# Patient Record
Sex: FEMALE | Race: WHITE | NOT HISPANIC OR LATINO | Employment: FULL TIME | URBAN - METROPOLITAN AREA
[De-identification: names, ages, dates, MRNs, and addresses within clinical notes are randomized per-mention and may not be internally consistent; named-entity substitution may affect disease eponyms.]

---

## 2022-03-23 ENCOUNTER — MEDICAL CORRESPONDENCE (OUTPATIENT)
Dept: HEALTH INFORMATION MANAGEMENT | Facility: CLINIC | Age: 62
End: 2022-03-23
Payer: COMMERCIAL

## 2022-04-26 NOTE — PROGRESS NOTES
Chief Complaint   Patient presents with     Sinus Problem     Home sleep study done in [facility]- told she has sleep apnea and snoring- patient states she has sinus issues - frequent sinus infections with no antibiotic treatment- c/o symptoms fouls smelling discharge from her nose for all of her life - has seen other ENT doctors with no treatment- has left hearing aid and c/o ear pain in that ear     History of Present Illness   Janell Lyle is a 61 year old female who presents for nose and sinus evaluation. I am seeing this patient in consultation for obstructive sleep apnea at the request of the provider Dr. Calvert. The patient has a history of Crouzon syndrome.  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  She essentially has no serviceable hearing on the right-hand side and hears only in her left ear.  She does wear hearing aid in the left ear.  She is previously undergone a mandibular reduction surgery to try to bring her maxillomandibular arches into alignment.  She was to undergo a second stage of the surgery, LeFort III osteotomies to bring the maxilla to the mandible.  The second stage of the surgery was not performed.    The patient was evaluated for significant snoring, noisy breathing, daytime somnolence, witnessed active pauses.  She underwent a home sleep study on 1/18/2022.  My review of the home sleep study demonstrates severe sleep apnea.  The respiratory event index was 59 events per hour.  The events were primarily obstructive.  Her average oxygen saturation during the study was 90.7%.  The recommendation was for in lab study with consideration of PAP therapy titration.  The patient was also referred to ENT for evaluation of her craniofacial status and her airway.    The  "patient also reports having a long history of chronic postnasal drainage and drainage from the right side of her nose for as long as she can remember.  She also intermittently have pain in her left ear.  She does wear hearing aid in her left ear.  Currently denies any otorrhea or bloody otorrhea.  She has had her ears cleaned out in the past.  She has had the middle ear and mastoid surgery on the right previously.  Again, she is had multiple craniofacial surgeries, its uncertain to me if she is had any previous nose or sinus surgery.  She does not use any medications in her nose routinely.    Past Medical History  There is no problem list on file for this patient.    Current Medications   No current outpatient medications on file.    Allergies  Allergies   Allergen Reactions     Losartan Other (See Comments)     Rapid heartbeat       Social History   Social History     Socioeconomic History     Marital status:        Family History  Family History   Problem Relation Age of Onset     Other - See Comments Sister         covid     Cancer Daughter         thyroid       Review of Systems  As per HPI and PMHx, otherwise 10+ comprehensive system review is negative.    Physical Exam  BP (!) 158/84 (BP Location: Right arm, Patient Position: Chair, Cuff Size: Adult Regular)   Pulse 74   Temp 98.2  F (36.8  C) (Tympanic)   Ht 1.473 m (4' 10\")   Wt 90.7 kg (200 lb)   SpO2 96%   BMI 41.80 kg/m    GENERAL: Patient is a pleasant, cooperative 61 year old female in no acute distress.  HEAD: Normocephalic, atraumatic.  Hair and scalp are normal.  EYES: Pupils are equal, round, reactive to light and accommodation.  Extraocular movements are intact.  The sclera nonicteric without injection.  The extraocular structures are normal.  EARS: See below.  NOSE: Nares are patent.  Nasal mucosa is pink and moist.  Negative anterior rhinoscopy.  ORAL CAVITY: Dentition is in reasonably good repair.  The patient does have some slight " micrognathia and a very flat maxilla.  Mucous membranes are moist.  Tongue is mobile, protrudes to the midline.  Palate elevates symmetrically.  Tonsils are 2+, symmetric.  No erythema or exudate.  No oral cavity or oropharyngeal masses, lesions, ulcerations, leukoplakia.  Patient has a Devine tongue/palate position grade 4.  NECK: Supple, trachea is midline.  The patient has 2 finger breaths of mental distance. There no palpable cervical lymphadenopathy or masses bilaterally.  Palpation of the bilateral parotid and submandibular areas reveal no masses.  No thyromegaly.    NEUROLOGIC: Cranial nerves II through XII are grossly intact.  Voice is strong.  Patient is House-Brackmann I/VI bilaterally.  CARDIOVASCULAR: Extremities are warm and well-perfused.  No significant peripheral edema.  RESPIRATORY: Patient has nonlabored breathing without cough, wheeze, stridor.  PSYCHIATRIC: Patient is alert and oriented.  Mood and affect appear normal.  SKIN: Warm and dry.  No scalp, face, or neck lesions noted.    Procedure: Flexible Laryngoscopy  Indication: Severe obstructive sleep breathing, Crouzon syndrome, history of lower jaw surgery    To best visualize the upper airway anatomy and due to the chief complaint and HPI, I proceeded with flexible fiberoptic laryngoscopy examination.  The bilateral nasal cavities were anesthetized and decongested with a mixture of lidocaine and neosynephrine.  The bilateral nasal cavities were examined using a flexible fiberoptic laryngoscope.  There were no nasal cavity masses, polyps, or mucopurulence bilaterally.  The nasal septum is essentially midline.  Of the posterior nasal septum, there are 2 perforations  by a scar band near the area of the Vincent extending towards the nasopharynx..  The nasopharynx had a normal appearance with normal Eustachian tube openings and fossa of Rosenmuller bilaterally.  Minimal adenoid tissue.  In the oropharynx, the patient has significant  lateral collapse in addition to AP collapse from a very large tongue base.  This is evident even with the patient sitting up in the exam chair.  The base of tongue does reveal quite significant lingual tonsil hypertrophy.  The vallecula, epiglottis, aryepiglottic folds, arytenoids, and piriform sinuses were without mass or lesion.  The bilateral true vocal folds were symmetrically mobile without nodules or masses.  The visualized portions of the infraglottic and subglottic airway are unremarkable.  The scope was removed.  The patient tolerated the procedure well.                        Assessment and Plan     ICD-10-CM    1. Severe obstructive sleep apnea  G47.33    2. Crouzon syndrome  Q75.1    3. BMI 40.0-44.9, adult (H)  Z68.41    4. Post-nasal drainage  R09.82    5. Nasal septal perforation  J34.89    6. History of ear surgery  Z98.890      It was my pleasure seeing Janell Lyle today in clinic.  The patient presents with severe sleep apnea that is likely obstructive.  I think the patient's first objective would be to have an in lab study with PAP titration to help start treating her apnea.  At this stage, I do not think there will be any good surgical interventions for her sleep apnea given her apnea severity.  Family was told that she could not have her PAP titration until sometime in August 2022.  I can reach out to colleagues of the [facility] and or [facility] to see if we can get the patient in sooner especially to be seen with someone that has expertise with craniofacial syndrome such as Crouzon.    The patient has severe glossoptosis and very large tongue base that is likely contributing to her breathing troubles in general.  Her previous mandibular surgery to align her jaw to her maxilla likely did not significantly help this condition.  Based on her airway anatomy, maxillofacial surgery could be considered along with tongue base reduction or lingual tonsillectomy, however, given  the severity of her apnea, I think this would likely improve her apnea but not eliminate her apnea.  I think aggressive trial of CPAP or BiPAP prior to any surgical considerations would be first-line management.    With regards the patient's postnasal drainage symptoms, I think her primary focus should be managing her sleep apnea.  We could consider sinus CT/maxillofacial CT.  I want to make sure that I would not be duplicating or repeating imaging, especially if she will end up going to a center of excellence for craniofacial problems.      We could also discuss medical management of her nose including nasal saline irrigation and topical nasal steroids either in her rinse or as a nasal spray.  If symptoms are persistent or she is not improving, I think that we could obtain a sinus CT/maxillofacial CT to better evaluate her paranasal sinuses.    Melina/Janell to follow up with Primary Care provider regarding elevated blood pressure.    Giorgio Gaona MD  Department of Otolaryngology-Head and Neck Surgery  [facility]

## 2022-04-29 ENCOUNTER — OFFICE VISIT (OUTPATIENT)
Dept: OTOLARYNGOLOGY | Facility: CLINIC | Age: 62
End: 2022-04-29
Payer: COMMERCIAL

## 2022-04-29 VITALS
TEMPERATURE: 98.2 F | DIASTOLIC BLOOD PRESSURE: 84 MMHG | WEIGHT: 200 LBS | HEIGHT: 58 IN | HEART RATE: 74 BPM | BODY MASS INDEX: 41.98 KG/M2 | SYSTOLIC BLOOD PRESSURE: 158 MMHG | OXYGEN SATURATION: 96 %

## 2022-04-29 DIAGNOSIS — Q75.1 CROUZON SYNDROME: ICD-10-CM

## 2022-04-29 DIAGNOSIS — G47.33 SEVERE OBSTRUCTIVE SLEEP APNEA: Primary | ICD-10-CM

## 2022-04-29 DIAGNOSIS — R09.82 POST-NASAL DRAINAGE: ICD-10-CM

## 2022-04-29 DIAGNOSIS — J34.89 NASAL SEPTAL PERFORATION: ICD-10-CM

## 2022-04-29 DIAGNOSIS — Z98.890 HISTORY OF EAR SURGERY: ICD-10-CM

## 2022-04-29 PROCEDURE — 31575 DIAGNOSTIC LARYNGOSCOPY: CPT | Performed by: OTOLARYNGOLOGY

## 2022-04-29 PROCEDURE — 99244 OFF/OP CNSLTJ NEW/EST MOD 40: CPT | Mod: 25 | Performed by: OTOLARYNGOLOGY

## 2022-04-29 ASSESSMENT — PAIN SCALES - GENERAL: PAINLEVEL: MILD PAIN (2)

## 2022-04-29 NOTE — NURSING NOTE
Initial BP (!) 158/84 (BP Location: Right arm, Patient Position: Chair, Cuff Size: Adult Regular)   Pulse 74   Temp 98.2  F (36.8  C) (Tympanic)   Wt 90.7 kg (200 lb)   SpO2 96%  There is no height or weight on file to calculate BMI. .    Anni Fong LPN

## 2022-04-29 NOTE — LETTER
4/29/2022         RE: Janell Lyle  [address] [address]        Dear Colleague,    Thank you for referring your patient, Janell Lyle, to the [facility]. Please see a copy of my visit note below.    Chief Complaint   Patient presents with     Sinus Problem     Home sleep study done in [facility]- told she has sleep apnea and snoring- patient states she has sinus issues - frequent sinus infections with no antibiotic treatment- c/o symptoms fouls smelling discharge from her nose for all of her life - has seen other ENT doctors with no treatment- has left hearing aid and c/o ear pain in that ear     History of Present Illness   Janell Lyle is a 61 year old female who presents for nose and sinus evaluation. I am seeing this patient in consultation for obstructive sleep apnea at the request of the provider Dr. Calvert. The patient has a history of Crouzon syndrome.  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  She essentially has no serviceable hearing on the right-hand side and hears only in her left ear.  She does wear hearing aid in the left ear.  She is previously undergone a mandibular reduction surgery to try to bring her maxillomandibular arches into alignment.  She was to undergo a second stage of the surgery, LeFort III osteotomies to bring the maxilla to the mandible.  The second stage of the surgery was not performed.    The patient was evaluated for significant snoring, noisy breathing, daytime somnolence, witnessed active pauses.  She underwent a home sleep study on 1/18/2022.  My review of the home sleep study demonstrates severe sleep apnea.  The respiratory event index was 59 events per hour.  The events were primarily  "obstructive.  Her average oxygen saturation during the study was 90.7%.  The recommendation was for in lab study with consideration of PAP therapy titration.  The patient was also referred to ENT for evaluation of her craniofacial status and her airway.    The patient also reports having a long history of chronic postnasal drainage and drainage from the right side of her nose for as long as she can remember.  She also intermittently have pain in her left ear.  She does wear hearing aid in her left ear.  Currently denies any otorrhea or bloody otorrhea.  She has had her ears cleaned out in the past.  She has had the middle ear and mastoid surgery on the right previously.  Again, she is had multiple craniofacial surgeries, its uncertain to me if she is had any previous nose or sinus surgery.  She does not use any medications in her nose routinely.    Past Medical History  There is no problem list on file for this patient.    Current Medications   No current outpatient medications on file.    Allergies  Allergies   Allergen Reactions     Losartan Other (See Comments)     Rapid heartbeat       Social History   Social History     Socioeconomic History     Marital status:        Family History  Family History   Problem Relation Age of Onset     Other - See Comments Sister         covid     Cancer Daughter         thyroid       Review of Systems  As per HPI and PMHx, otherwise 10+ comprehensive system review is negative.    Physical Exam  BP (!) 158/84 (BP Location: Right arm, Patient Position: Chair, Cuff Size: Adult Regular)   Pulse 74   Temp 98.2  F (36.8  C) (Tympanic)   Ht 1.473 m (4' 10\")   Wt 90.7 kg (200 lb)   SpO2 96%   BMI 41.80 kg/m    GENERAL: Patient is a pleasant, cooperative 61 year old female in no acute distress.  HEAD: Normocephalic, atraumatic.  Hair and scalp are normal.  EYES: Pupils are equal, round, reactive to light and accommodation.  Extraocular movements are intact.  The sclera " nonicteric without injection.  The extraocular structures are normal.  EARS: See below.  NOSE: Nares are patent.  Nasal mucosa is pink and moist.  Negative anterior rhinoscopy.  ORAL CAVITY: Dentition is in reasonably good repair.  The patient does have some slight micrognathia and a very flat maxilla.  Mucous membranes are moist.  Tongue is mobile, protrudes to the midline.  Palate elevates symmetrically.  Tonsils are 2+, symmetric.  No erythema or exudate.  No oral cavity or oropharyngeal masses, lesions, ulcerations, leukoplakia.  Patient has a Devine tongue/palate position grade 4.  NECK: Supple, trachea is midline.  The patient has 2 finger breaths of mental distance. There no palpable cervical lymphadenopathy or masses bilaterally.  Palpation of the bilateral parotid and submandibular areas reveal no masses.  No thyromegaly.    NEUROLOGIC: Cranial nerves II through XII are grossly intact.  Voice is strong.  Patient is House-Brackmann I/VI bilaterally.  CARDIOVASCULAR: Extremities are warm and well-perfused.  No significant peripheral edema.  RESPIRATORY: Patient has nonlabored breathing without cough, wheeze, stridor.  PSYCHIATRIC: Patient is alert and oriented.  Mood and affect appear normal.  SKIN: Warm and dry.  No scalp, face, or neck lesions noted.    Procedure: Flexible Laryngoscopy  Indication: Severe obstructive sleep breathing, Crouzon syndrome, history of lower jaw surgery    To best visualize the upper airway anatomy and due to the chief complaint and HPI, I proceeded with flexible fiberoptic laryngoscopy examination.  The bilateral nasal cavities were anesthetized and decongested with a mixture of lidocaine and neosynephrine.  The bilateral nasal cavities were examined using a flexible fiberoptic laryngoscope.  There were no nasal cavity masses, polyps, or mucopurulence bilaterally.  The nasal septum is essentially midline.  Of the posterior nasal septum, there are 2 perforations  by a  scar band near the area of the Vincent extending towards the nasopharynx..  The nasopharynx had a normal appearance with normal Eustachian tube openings and fossa of Rosenmuller bilaterally.  Minimal adenoid tissue.  In the oropharynx, the patient has significant lateral collapse in addition to AP collapse from a very large tongue base.  This is evident even with the patient sitting up in the exam chair.  The base of tongue does reveal quite significant lingual tonsil hypertrophy.  The vallecula, epiglottis, aryepiglottic folds, arytenoids, and piriform sinuses were without mass or lesion.  The bilateral true vocal folds were symmetrically mobile without nodules or masses.  The visualized portions of the infraglottic and subglottic airway are unremarkable.  The scope was removed.  The patient tolerated the procedure well.                        Assessment and Plan     ICD-10-CM    1. Severe obstructive sleep apnea  G47.33    2. Crouzon syndrome  Q75.1    3. BMI 40.0-44.9, adult (H)  Z68.41    4. Post-nasal drainage  R09.82    5. Nasal septal perforation  J34.89    6. History of ear surgery  Z98.890      It was my pleasure seeing Janell Lyle today in clinic.  The patient presents with severe sleep apnea that is likely obstructive.  I think the patient's first objective would be to have an in lab study with PAP titration to help start treating her apnea.  At this stage, I do not think there will be any good surgical interventions for her sleep apnea given her apnea severity.  Family was told that she could not have her PAP titration until sometime in August 2022.  I can reach out to colleagues of the [facility] and or [facility] to see if we can get the patient in sooner especially to be seen with someone that has expertise with craniofacial syndrome such as Crouzon.    The patient has severe glossoptosis and very large tongue base that is likely contributing to her breathing troubles in  general.  Her previous mandibular surgery to align her jaw to her maxilla likely did not significantly help this condition.  Based on her airway anatomy, maxillofacial surgery could be considered along with tongue base reduction or lingual tonsillectomy, however, given the severity of her apnea, I think this would likely improve her apnea but not eliminate her apnea.  I think aggressive trial of CPAP or BiPAP prior to any surgical considerations would be first-line management.    With regards the patient's postnasal drainage symptoms, I think her primary focus should be managing her sleep apnea.  We could consider sinus CT/maxillofacial CT.  I want to make sure that I would not be duplicating or repeating imaging, especially if she will end up going to a center of excellence for craniofacial problems.      We could also discuss medical management of her nose including nasal saline irrigation and topical nasal steroids either in her rinse or as a nasal spray.  If symptoms are persistent or she is not improving, I think that we could obtain a sinus CT/maxillofacial CT to better evaluate her paranasal sinuses.    Melina/Janell to follow up with Primary Care provider regarding elevated blood pressure.    Giorgio Gaona MD  Department of Otolaryngology-Head and Neck Surgery  [facility]         Again, thank you for allowing me to participate in the care of your patient.        Sincerely,        Giorgio Gaona MD

## 2022-05-03 NOTE — CONFIDENTIAL NOTE
Telephone call -message left on patient's telephone and [employer] as we were not able to reach her for discussion.  Patient with Crouzon syndome referred through Dr. Gaona of otolaryngology for initiation of therapy for severe obstructive sleep apnea.  61-year-old female with Crouzon syndrome and complicating severe obstructive sleep apnea in the setting of accompanying obesity with a BMI of 42, insomnia on doxylamine, hypertension, and asthma.  Home sleep testing demonstrated severe obstructive sleep apnea with an AHI of 59 without accompanying central events or Cheyne-Aldrich respiration although periodic breathing was reported qualitatively (see below).  She is being considered ultimately for possible upper airway surgery.    We can certainly initiate auto titration CPAP with careful monitoring for central events if this is her preference.

## 2022-06-19 ENCOUNTER — HEALTH MAINTENANCE LETTER (OUTPATIENT)
Age: 62
End: 2022-06-19

## 2022-11-19 ENCOUNTER — HEALTH MAINTENANCE LETTER (OUTPATIENT)
Age: 62
End: 2022-11-19

## 2023-04-09 ENCOUNTER — HEALTH MAINTENANCE LETTER (OUTPATIENT)
Age: 63
End: 2023-04-09

## 2023-12-31 NOTE — PROGRESS NOTES
Cerumen Removal - Patient here for ear cleaning. She has a h/o right mastoidectomy for repair of a CSF leak. Denies ear pain, drainage, and infection.     Physical Exam and Procedure  Ears - On examination of the ears, I found that both ears were impacted with cerumen.  Therefore, I positioned the patient in the examination chair in a semi-supine position. I used the binocular surgical microscope to perform cerumen removal.  On the right side, I began by using a cerumen loop to gently lift the edges of the cerumen mass away from the walls of the external canal.  Once I did this, I was able to pull away fragments of wax and debris. I removed all the wax and debri. She had a shallow mastoid bowl and the medial external auditory canal is stenotic with not tympanic membrane visualized.     I turned my attention to the left side once again using the binocular surgical microscope to perform cerumen removal.  I began by using a cerumen loop to gently lift the edges of the cerumen mass away from the walls of the external canal.  Once I did this, I was able to pull away fragments of wax and debris. I removed all the wax and debri. The tympanic membrane was intact, no sign of perforation or middle ear effusion.    A/P - bilateral cerumen impaction. Both ears cleaned today in clinic. Return prn.

## 2024-01-03 ENCOUNTER — OFFICE VISIT (OUTPATIENT)
Dept: OTOLARYNGOLOGY | Facility: CLINIC | Age: 64
End: 2024-01-03
Payer: COMMERCIAL

## 2024-01-03 VITALS
HEART RATE: 64 BPM | SYSTOLIC BLOOD PRESSURE: 149 MMHG | RESPIRATION RATE: 22 BRPM | DIASTOLIC BLOOD PRESSURE: 66 MMHG | OXYGEN SATURATION: 96 %

## 2024-01-03 DIAGNOSIS — H61.23 BILATERAL IMPACTED CERUMEN: Primary | ICD-10-CM

## 2024-01-03 PROCEDURE — 69210 REMOVE IMPACTED EAR WAX UNI: CPT | Mod: 50 | Performed by: OTOLARYNGOLOGY

## 2024-01-03 NOTE — LETTER
1/3/2024         RE: Janell Lyle  [address] [address]        Dear Colleague,    Thank you for referring your patient, Janell Lyle, to the [facility]. Please see a copy of my visit note below.    Cerumen Removal - Patient here for ear cleaning. She has a h/o right mastoidectomy for repair of a CSF leak. Denies ear pain, drainage, and infection.     Physical Exam and Procedure  Ears - On examination of the ears, I found that both ears were impacted with cerumen.  Therefore, I positioned the patient in the examination chair in a semi-supine position. I used the binocular surgical microscope to perform cerumen removal.  On the right side, I began by using a cerumen loop to gently lift the edges of the cerumen mass away from the walls of the external canal.  Once I did this, I was able to pull away fragments of wax and debris. I removed all the wax and debri. She had a shallow mastoid bowl and the medial external auditory canal is stenotic with not tympanic membrane visualized.     I turned my attention to the left side once again using the binocular surgical microscope to perform cerumen removal.  I began by using a cerumen loop to gently lift the edges of the cerumen mass away from the walls of the external canal.  Once I did this, I was able to pull away fragments of wax and debris. I removed all the wax and debri. The tympanic membrane was intact, no sign of perforation or middle ear effusion.    A/P - bilateral cerumen impaction. Both ears cleaned today in clinic. Return prn.       Again, thank you for allowing me to participate in the care of your patient.        Sincerely,        Javier Mendoza MD

## 2025-01-08 ENCOUNTER — TELEPHONE (OUTPATIENT)
Dept: CONSULT | Facility: CLINIC | Age: 65
End: 2025-01-08
Payer: COMMERCIAL

## 2025-01-08 NOTE — TELEPHONE ENCOUNTER
Spoke with Janell and she consented to participcation in her daughter Mary Alice Smith's whole exome sequencing genetic testing. Janell also consented to secondary findings. Risks benefits and limitations of this testing and secondary findings were reviewed. Janell has Type 2 diabetes, no history of bone fractures or breaks, no history of bone density scan, no seizures, no problems with her immune system, no missing teeth or brittle teeth, and she has a diagnosis of crouzon syndrome.    Aniyah Garnett MS [facility]  Genetic Counselor  Division of Genetics and Metabolism  (p) [phone]  (f) [phone]

## 2025-02-01 ENCOUNTER — HEALTH MAINTENANCE LETTER (OUTPATIENT)
Age: 65
End: 2025-02-01